# Patient Record
Sex: MALE | HISPANIC OR LATINO
[De-identification: names, ages, dates, MRNs, and addresses within clinical notes are randomized per-mention and may not be internally consistent; named-entity substitution may affect disease eponyms.]

---
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
## 2023-11-13 ENCOUNTER — APPOINTMENT (OUTPATIENT)
Dept: PEDIATRIC ADOLESCENT MEDICINE | Facility: CLINIC | Age: 18
End: 2023-11-13

## 2023-11-13 VITALS
HEART RATE: 75 BPM | TEMPERATURE: 98.5 F | BODY MASS INDEX: 29.88 KG/M2 | DIASTOLIC BLOOD PRESSURE: 70 MMHG | HEIGHT: 64.1 IN | WEIGHT: 175 LBS | SYSTOLIC BLOOD PRESSURE: 126 MMHG | OXYGEN SATURATION: 98 %

## 2023-11-13 DIAGNOSIS — K02.9 DENTAL CARIES, UNSPECIFIED: ICD-10-CM

## 2023-11-13 DIAGNOSIS — Z13.30 ENCOUNTER FOR SCREENING EXAM FOR MENTAL HEALTH AND BEHAVIORAL DISORDERS,UNSPEC: ICD-10-CM

## 2023-11-14 PROBLEM — K02.9 DENTAL CAVITY: Status: ACTIVE | Noted: 2023-11-14

## 2023-11-14 PROBLEM — Z13.30 ENCOUNTER FOR BEHAVIORAL HEALTH SCREENING: Status: ACTIVE | Noted: 2023-11-14
